# Patient Record
Sex: MALE | URBAN - METROPOLITAN AREA
[De-identification: names, ages, dates, MRNs, and addresses within clinical notes are randomized per-mention and may not be internally consistent; named-entity substitution may affect disease eponyms.]

---

## 2024-01-05 ENCOUNTER — TELEPHONE (OUTPATIENT)
Age: 54
End: 2024-01-05

## 2024-01-05 ENCOUNTER — OFFICE VISIT (OUTPATIENT)
Age: 54
End: 2024-01-05
Payer: OTHER GOVERNMENT

## 2024-01-05 ENCOUNTER — OFFICE VISIT (OUTPATIENT)
Age: 54
End: 2024-01-05

## 2024-01-05 VITALS
BODY MASS INDEX: 32.2 KG/M2 | DIASTOLIC BLOOD PRESSURE: 75 MMHG | OXYGEN SATURATION: 97 % | SYSTOLIC BLOOD PRESSURE: 124 MMHG | HEART RATE: 68 BPM | WEIGHT: 230 LBS | HEIGHT: 71 IN

## 2024-01-05 DIAGNOSIS — H93.13 TINNITUS, BILATERAL: ICD-10-CM

## 2024-01-05 DIAGNOSIS — H90.3 SENSORINEURAL HEARING LOSS, BILATERAL: Primary | ICD-10-CM

## 2024-01-05 DIAGNOSIS — H90.3 SENSORINEURAL HEARING LOSS (SNHL) OF BOTH EARS: Primary | ICD-10-CM

## 2024-01-05 PROCEDURE — 99213 OFFICE O/P EST LOW 20 MIN: CPT | Performed by: NURSE PRACTITIONER

## 2024-01-05 ASSESSMENT — ENCOUNTER SYMPTOMS
RESPIRATORY NEGATIVE: 1
GASTROINTESTINAL NEGATIVE: 1
ALLERGIC/IMMUNOLOGIC NEGATIVE: 1
EYES NEGATIVE: 1

## 2024-01-05 NOTE — PROGRESS NOTES
Subjective:    Travis Garcia   53 y.o.   1970     New Patient Visit  12/22/2023 This is a 53 y.o. male who is here today to discuss issues with tinnitus. He is in the  and odes have a history of loud noise exposure, he does use hearing protection when around loud noise. He does also note he has some difficulty hearing others if he is in a large room where there is background noise or echoing. Denies trauma to the head or ears, head or neck surgery.     1/5/2024- Patient has returned today for hearing test and follow-up. Audiogram shows SNHL.           Review of Systems  Review of Systems   Constitutional: Negative.    HENT:  Positive for hearing loss and tinnitus.    Eyes: Negative.    Respiratory: Negative.     Cardiovascular: Negative.    Gastrointestinal: Negative.    Endocrine: Negative.    Genitourinary: Negative.    Musculoskeletal: Negative.    Skin: Negative.    Allergic/Immunologic: Negative.    Neurological: Negative.    Hematological: Negative.    Psychiatric/Behavioral: Negative.             Past Medical History:   Diagnosis Date    GERD (gastroesophageal reflux disease)     Hypertension      Past Surgical History:   Procedure Laterality Date    TONSILLECTOMY        No family history on file.  Social History     Tobacco Use    Smoking status: Never    Smokeless tobacco: Never   Substance Use Topics    Alcohol use: Not Currently      Prior to Admission medications    Medication Sig Start Date End Date Taking? Authorizing Provider   cetirizine (ZYRTEC) 10 MG tablet  12/6/23   ProviderJc MD   omeprazole (PRILOSEC) 20 MG delayed release capsule Take by mouth daily 9/25/23   ProviderJc MD   fluticasone (FLONASE) 50 MCG/ACT nasal spray  12/6/23   ProviderJc MD   lisinopril (PRINIVIL;ZESTRIL) 10 MG tablet  12/15/23   ProviderJc MD        Allergies   Allergen Reactions    Latex Rash         Objective:     /75   Pulse 68   Ht 1.803 m (5' 11\")   Wt

## 2024-01-05 NOTE — PROGRESS NOTES
Travis Garcia   1970, 53 y.o. male   [id number]       Referring Provider: PACO Gardner  Referral Type: In an order in Epic    Reason for Visit: Evaluation of suspected change in hearing, tinnitus, or balance.    ADULT AUDIOLOGIC EVALUATION      Travis Garcia is a 53 y.o. male seen today, 1/5/2024 , for an initial audiologic evaluation.  Patient was seen by PACO Gardner following today's evaluation.     AUDIOLOGIC AND OTHER PERTINENT MEDICAL HISTORY:      Travis Garcia noted tinnitus, bilaterally and possible decline in hearing as he notices increased difficulty hearing details of conversation especially with noise present. He also notes history of noise exposure through service in  and family history of age related hearing loss in father who reportedly recent purchased hearing aids. No additional significant otologic or medical history was reported.     Travis Garcia denied otalgia, aural fullness, otorrhea, dizziness, imbalance, history of falls, history of head trauma, and history of ear surgery.    Date: 1/5/2024     IMPRESSIONS:      Today's results revealed a symmetric sensorineural hearing loss with excellent word recognition, bilaterally. Hearing loss significant enough to create hearing difficulty in at least some listening situations. Discussed benefits of amplification. Follow medical recommendations of PACO Gardner.    ASSESSMENT AND FINDINGS:     Otoscopy revealed: Clear ear canals bilaterally    RIGHT EAR:  Hearing Sensitivity: Within normal limits through 1000Hz sloping to a mild to moderately-severe sensorineural hearing loss.   Speech Recognition Threshold: 25 dB HL  Word Recognition: Excellent 92%, based on NU-6 25-word list at 70 dBHL using monitored live voice speech stimuli.    Tympanometry: Normal peak pressure with high compliance, Type Ad tympanogram, consistent with hypermobile tympanic membrane.      LEFT EAR:  Hearing Sensitivity: Within normal limits

## 2024-01-05 NOTE — TELEPHONE ENCOUNTER
This patient was referred to ENT and has [employer] insurance. Could you put in a referral to audiology so I can attach it to the hearing test visit?

## 2024-01-05 NOTE — PROGRESS NOTES
/75   Pulse 68   Ht 1.803 m (5' 11\")   Wt 104.3 kg (230 lb)   SpO2 97%   BMI 32.08 kg/m²   Chief Complaint   Patient presents with    Follow-up      HT

## 2024-01-05 NOTE — PATIENT INSTRUCTIONS
loud machines.    Hearing loss can affect your work and home life. It can make you feel lonely or depressed. You may feel that you have lost your independence. But hearing aids and other devices can help you hear better and feel connected to others.    Follow-up care is a key part of your treatment and safety. Be sure to make and go to all appointments, and call your doctor if you are having problems. It's also a good idea to know your test results and keep a list of the medicines you take.    How can you care for yourself at home?  Avoid loud noises whenever possible. This helps keep your hearing from getting worse.  Always wear hearing protection around loud noises.  If appropriate, wear hearing aid(s) as directed.  It is recommended that hearing aids are worn during all waking hours to keep your brain active and give it access to the sounds it is missing.      If you are beginning your process with hearing aid(s), schedule a \"Hearing Aid Evaluation\" with an audiologist to discuss your lifestyle, features of hearing aid technology, and styles of hearing aids available.  It is recommended that you contact your insurance company to determine if you have a hearing aid benefit, as this may dictate who you can see for these services.  Have hearing tests as your doctor suggests. They can show whether your hearing has changed. Your hearing aid may need to be adjusted.  Use other assistive devices as needed. These may include:  Telephone amplifiers and hearing aids that can connect to a television, stereo, radio, or microphone.  Devices that use lights or vibrations. These alert you to the doorbell, a ringing telephone, or a baby monitor.  Television closed-captioning. This shows the words at the bottom of the screen. Most new TVs can do this.  TTY (text telephone). This lets you type messages back and forth on the telephone instead of talking or listening. These devices are also called TDD. When messages are typed on the